# Patient Record
Sex: FEMALE | Race: WHITE
[De-identification: names, ages, dates, MRNs, and addresses within clinical notes are randomized per-mention and may not be internally consistent; named-entity substitution may affect disease eponyms.]

---

## 2019-02-21 ENCOUNTER — HOSPITAL ENCOUNTER (EMERGENCY)
Dept: HOSPITAL 97 - ER | Age: 39
Discharge: HOME | End: 2019-02-21
Payer: SELF-PAY

## 2019-02-21 DIAGNOSIS — S82.51XA: Primary | ICD-10-CM
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
## 2019-02-21 DIAGNOSIS — Z88.7: ICD-10-CM
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
## 2019-02-21 PROCEDURE — 99284 EMERGENCY DEPT VISIT MOD MDM: CPT

## 2019-02-21 PROCEDURE — 2W3QX1Z IMMOBILIZATION OF RIGHT LOWER LEG USING SPLINT: ICD-10-PCS

## 2019-02-21 PROCEDURE — 96372 THER/PROPH/DIAG INJ SC/IM: CPT

## 2019-02-21 NOTE — RAD REPORT
EXAM DESCRIPTION:  RAD - Ankle Right 3 View - 2/21/2019 8:10 am

 

CLINICAL HISTORY:  fall, pain

Trauma, pain

 

COMPARISON:  No comparisons

 

FINDINGS:  Minimal avulsion fracture is suspected in the region of the medial malleolus. Adjacent mod
erate soft tissue swelling is seen with a small ankle joint effusion noted. Small posterior and plant
ar calcaneal spurs. No dislocation.

## 2019-02-21 NOTE — ER
Nurse's Notes                                                                                     

 [facility]                                                                

Name: Maria Del Carmen Delacruz                                                                                  

Age: 38 yrs                                                                                       

Sex: Female                                                                                       

: 1980                                                                                   

MRN: [id number]                                                                                   

Arrival Date: 2019                                                                          

Time: 07:40                                                                                       

Account#: [id number]                                                                            

Bed 13                                                                                            

Private MD: Bean Mayberry E                                                                     

Diagnosis: Right Medial malleolar fx (avulsion)                                                   

                                                                                                  

Presentation:                                                                                     

                                                                                             

07:42 Presenting complaint: Patient states: "I slipped and fell last night and rolled my      aa5 

      right foot". Pt c/o right ankle pain. Pt ambulatory with crutches and walking boot.         

07:42 Transition of care: patient was not received from another setting of care. Onset of     aa5 

      symptoms was 2019. Risk Assessment: Do you want to hurt yourself or someone        

      else? Patient reports no desire to harm self or others. Initial Sepsis Screen: Does the     

      patient meet any 2 criteria? No. Patient's initial sepsis screen is negative. Does the      

      patient have a suspected source of infection? No. Patient's initial sepsis screen is        

      negative. Care prior to arrival: None.                                                      

07:42 Method Of Arrival: Ambulatory                                                           aa5 

07:42 Acuity: ISAIAS 4                                                                           aa5 

                                                                                                  

OB/GYN:                                                                                           

07:45 LMP 2019                                                                           aa5 

                                                                                                  

Historical:                                                                                       

- Allergies:                                                                                      

07:42 hepatitis Vaccine;                                                                      aa5 

- PMHx:                                                                                           

07:42 hypoglycemia;                                                                           aa5 

- PSHx:                                                                                           

07:42 R knee; Tubal ligation;                                                                 aa5 

                                                                                                  

- Ebola Screening: : No symptoms or risks identified at this time.                                

                                                                                                  

                                                                                                  

Screenin:03 Abuse screen: Denies threats or abuse. Nutritional screening: No deficits noted.        aa5 

      Tuberculosis screening: No symptoms or risk factors identified. Fall Risk Ambulatory        

      Aid- Crutches/Cane/Walker (15 pts). Total Jacobson Fall Scale indicates No Risk (0-24 pts).    

                                                                                                  

Assessment:                                                                                       

07:45 General: Appears uncomfortable, Behavior is calm, cooperative. Pain: Complains of pain  aa5 

      in right ankle Pain radiates to medial aspect of right calf Pain currently is 9 out of      

      10 on a pain scale. Quality of pain is described as sharp, throbbing, Pain began 1 day      

      ago. Is continuous, Aggravated by increased activity. Neuro: Level of Consciousness is      

      awake, alert, obeys commands, Oriented to person, place, time, situation.                   

      Cardiovascular: Capillary refill < 3 seconds is brisk in bilateral fingers toes             

      Patient's skin is warm and dry. Respiratory: Airway is patent Respiratory effort is         

      even, unlabored, Respiratory pattern is regular, symmetrical. GI: No signs and/or           

      symptoms were reported involving the gastrointestinal system. : No signs and/or           

      symptoms were reported regarding the genitourinary system. EENT: No signs and/or            

      symptoms were reported regarding the EENT system. Derm: Skin is pink, warm \T\ dry.         

      Musculoskeletal: Swelling present in right ankle.                                           

08:05 Reassessment: X-ray at bedside .                                                        aa5 

09:10 Reassessment: Patient is alert, oriented x 3, equal unlabored respirations, skin        aa5 

      warm/dry/pink. Patient states symptoms have not improved. MD was notified. .                

09:10 Pain: Pain currently is 9 out of 10 on a pain scale.                                    aa5 

09:30 Reassessment: Patient is alert, oriented x 3, equal unlabored respirations, skin        aa5 

      warm/dry/pink. Patient states symptoms have improved. Waiting for pt's mother for ride      

      home. Pain: Pain currently is 6 out of 10 on a pain scale.                                  

                                                                                                  

Vital Signs:                                                                                      

07:45  / 102; Pulse 111; Resp 18 S; Temp 98.4(O); Pulse Ox 100% on R/A; Weight 80.74 kg aa5 

      (R); Height 5 ft. 5 in. (165.10 cm) (R); Pain 9/10;                                         

09:30  / 99; Pulse 95; Resp 16 S; Pulse Ox 100% on R/A;                                 aa5 

07:45 Body Mass Index 29.62 (80.74 kg, 165.10 cm)                                             aa5 

                                                                                                  

ED Course:                                                                                        

07:40 Patient arrived in ED.                                                                  mr  

07:40 Bean Mayberry MD is Private Physician.                                                mr  

07:42 Arm band placed on Patient placed in an exam room, on a stretcher.                      aa5 

07:42 Patient has correct armband on for positive identification. Bed in low position. Call   aa5 

      light in reach. Side rails up X 1.                                                          

07:43 Galileo Coyle MD is Attending Physician.                                             ps1 

07:43 Luisana Lock, RN is Primary Nurse.                                                   aa5 

07:59 Triage completed.                                                                       aa5 

08:11 Ankle Right 3 View XRAY In Process Unspecified.                                         EDMS

08:41 Davis Choi MD is Referral Physician.                                               ps1 

09:15 Orthoglass splint: Posterior short lleg splint applied on right leg.                    mh5 

09:40 No provider procedures requiring assistance completed. Patient did not have IV access   aa5 

      during this emergency room visit.                                                           

                                                                                                  

Administered Medications:                                                                         

07:52 Drug: Norco (7.5 mg-325 mg) 1 tabs Route: PO;                                           aa5 

09:10 Follow up: Response: No adverse reaction; Pain is unchanged, physician notified         aa5 

09:11 Drug: TORadol 30 mg Route: IM; Site: left gluteus;                                      aa5 

09:30 Follow up: Response: No adverse reaction; Pain is decreased                             aa5 

                                                                                                  

                                                                                                  

Outcome:                                                                                          

08:42 Discharge ordered by MD.                                                                ps1 

09:40 Discharged to home via wheelchair, with crutches, with family.                          aa5 

09:40 Condition: stable                                                                           

09:40 Discharge instructions given to patient, Instructed on discharge instructions, follow       

      up and referral plans. medication usage, Demonstrated understanding of instructions,        

      follow-up care, medications, Prescriptions given X 4.                                       

09:45 Patient left the ED.                                                                    aa5 

                                                                                                  

Signatures:                                                                                       

Dispatcher [employer]                           VIVIANMS                                                 

Zakiya MuhammadderLuisana mccormick, RN                     RN   5                                                  

Cyndi Mathew                              5                                                  

Galileo Coyle MD MD   ps1                                                  

                                                                                                  

Corrections: (The following items were deleted from the chart)                                    

10:50 09:56 Patient left the ED. aa5                                                          aa5 

                                                                                                  

**************************************************************************************************

## 2019-02-21 NOTE — EDPHYS
Physician Documentation                                                                           

 [facility]                                                                

Name: Maria Del Carmen Delacruz                                                                                  

Age: 38 yrs                                                                                       

Sex: Female                                                                                       

: 1980                                                                                   

MRN: [id number]                                                                                   

Arrival Date: 2019                                                                          

Time: 07:40                                                                                       

Account#: [id number]                                                                            

Bed 13                                                                                            

Private MD: Bean Mayberry E                                                                     

ED Physician Galileo Coyle                                                                      

HPI:                                                                                              

                                                                                             

07:47 This 38 yrs old  Female presents to ER via [facility] with complaints of Ankle ps1 

      Injury.                                                                                     

07:47 patient was corralling dogs into a pen last night and had an eversion injury to the     ps1 

      right ankle. Patient said she heard a pop. Pain localized to lateral and medial             

      malleolus. Pain rated as moderate. Mild amount of superficial swelling. Difficulty to       

      bear weight. Did not hit head, no LOC. Patient presented in a walking boot. .               

                                                                                                  

OB/GYN:                                                                                           

07:45 LMP 2019                                                                           aa5 

                                                                                                  

Historical:                                                                                       

- Allergies:                                                                                      

07:42 hepatitis Vaccine;                                                                      aa5 

- PMHx:                                                                                           

07:42 hypoglycemia;                                                                           aa5 

- PSHx:                                                                                           

07:42 R knee; Tubal ligation;                                                                 aa5 

                                                                                                  

- Ebola Screening: : No symptoms or risks identified at this time.                                

                                                                                                  

                                                                                                  

ROS:                                                                                              

07:47 Constitutional: Negative for fever, chills, and weight loss, Eyes: Negative for injury, ps1 

      pain, redness, and discharge, Cardiovascular: Negative for chest pain, palpitations,        

      and edema, Respiratory: Negative for shortness of breath, cough, wheezing, and              

      pleuritic chest pain, Abdomen/GI: Negative for abdominal pain, nausea, vomiting,            

      diarrhea, and constipation, Back: Negative for injury and pain, Skin: Negative for          

      injury, rash, and discoloration, Neuro: Negative for headache, weakness, numbness,          

      tingling, and seizure.                                                                      

07:47 MS/extremity: Positive for pain, swelling, tenderness, of the right lateral malleolus       

      and right medial malleolus.                                                                 

                                                                                                  

Exam:                                                                                             

07:50 Constitutional:  This is a well developed, well nourished patient who is awake, alert,  ps1 

      and in no acute distress. Head/Face:  Normocephalic, atraumatic. Eyes:  Pupils equal        

      round and reactive to light, extra-ocular motions intact.  Lids and lashes normal.          

      Conjunctiva and sclera are non-icteric and not injected. Chest/axilla:  Normal chest        

      wall appearance and motion.  Nontender with no deformity.  No lesions are appreciated.      

      Cardiovascular:  Regular rate and rhythm.  No gallops, murmurs, or rubs.  Normal PMI,       

      no JVD.  No pulse deficits. Respiratory:  Lungs have equal breath sounds bilaterally,       

      clear to auscultation and percussion.  No rales, rhonchi or wheezes noted.  No              

      increased work of breathing, no retractions or nasal flaring. Abdomen/GI:  Soft,            

      non-tender, with normal bowel sounds.  No distension or tympany.  No guarding or            

      rebound.  No evidence of tenderness throughout. Skin:  Warm, dry with normal turgor.        

      Normal color with no rashes, no lesions, and no evidence of cellulitis. Neuro:  Awake       

      and alert, GCS 15, oriented to person, place, time, and situation.  Cranial nerves          

      II-XII grossly intact. Sensory grossly intact. Psych:  Awake, alert, with orientation       

      to person, place and time.  Behavior, mood, and affect are within normal limits.            

07:50 Musculoskeletal/extremity: Extremities: grossly normal except: noted in the right           

      lateral malleolus: swelling, tenderness, There is no evidence of  deformity.                

                                                                                                  

Vital Signs:                                                                                      

07:45  / 102; Pulse 111; Resp 18 S; Temp 98.4(O); Pulse Ox 100% on R/A; Weight 80.74 kg aa5 

      (R); Height 5 ft. 5 in. (165.10 cm) (R); Pain 9/10;                                         

09:30  / 99; Pulse 95; Resp 16 S; Pulse Ox 100% on R/A;                                 aa5 

07:45 Body Mass Index 29.62 (80.74 kg, 165.10 cm)                                             aa5 

                                                                                                  

Procedures:                                                                                       

08:48 Splinting: Splint applied to right leg using Orthoglass splint, applied by tech.        ps1 

      Examined by me, post splint application: neurovascular intact, 2+ distal pulses             

      palpable, brisk capillary refill noted, Patient tolerated well.                             

                                                                                                  

MDM:                                                                                              

07:53 Patient medically screened.                                                             ps1 

08:48 Data reviewed: vital signs, nurses notes, radiologic studies, and as a result, I will   ps1 

      discharge patient.                                                                          

                                                                                                  

                                                                                             

07:51 Order name: Ankle Right 3 View XRAY; Complete Time: 08:39                               ps1 

                                                                                             

08:52 Order name: Posterior Leg Splint: short leg; Complete Time: 09:10                       aa5 

                                                                                                  

Administered Medications:                                                                         

07:52 Drug: Norco (7.5 mg-325 mg) 1 tabs Route: PO;                                           aa5 

09:10 Follow up: Response: No adverse reaction; Pain is unchanged, physician notified         aa5 

09:11 Drug: TORadol 30 mg Route: IM; Site: left gluteus;                                      aa5 

09:30 Follow up: Response: No adverse reaction; Pain is decreased                             aa5 

                                                                                                  

                                                                                                  

Disposition:                                                                                      

19 08:42 Discharged to Home. Impression: Right Medial malleolar fx (avulsion).              

- Condition is Stable.                                                                            

- Discharge Instructions: Ankle Fracture, Easy-to-Read.                                           

- Prescriptions for Anaprox  mg Oral Tablet - take 1 tablet by ORAL route every             

  12 hours As needed; 20 tablet. Tylenol- Codeine #3 300-30 mg Oral Tablet - take 2               

  tablet by ORAL route every 6 hours As needed; 30 tablet. Zofran 4 mg Oral Tablet -              

  take 1 tablet by ORAL route every 12 hours As needed; 20 tablet. Medrol (Willie) 4 mg              

  Oral Tablets, Dose Pack - take 1 tablet by ORAL route as directed - follow package              

  instructions; 1 packet.                                                                         

- Medication Reconciliation Form, Thank You Letter, Antibiotic Education, Prescription            

  Opioid Use form.                                                                                

- Follow up: Davis Choi MD; When: 1 week; Reason: Further diagnostic work-up,                

  Recheck today's complaints, Continuance of care.                                                

- Problem is new.                                                                                 

- Symptoms have improved.                                                                         

                                                                                                  

                                                                                                  

                                                                                                  

Signatures:                                                                                       

Dispatcher [employer]                           EDMS                                                 

Luisana Lock RN                     RN   aa5                                                  

Galileo Coyle MD MD   ps1                                                  

                                                                                                  

Corrections: (The following items were deleted from the chart)                                    

09:56 08:42 2019 08:42 Discharged to Home. Impression: Right Medial malleolar fx        aa5 

      (avulsion). Condition is Stable. Forms are Medication Reconciliation Form, Thank You        

      Letter, Antibiotic Education, Prescription Opioid Use. Follow up: Davis Choi; When:      

      1 week; Reason: Further diagnostic work-up, Recheck today's complaints, Continuance of      

      care. Problem is new. Symptoms have improved. ps1                                           

                                                                                                  

**************************************************************************************************

## 2020-03-03 ENCOUNTER — HOSPITAL ENCOUNTER (EMERGENCY)
Dept: HOSPITAL 97 - ER | Age: 40
Discharge: HOME | End: 2020-03-03
Payer: COMMERCIAL

## 2020-03-03 VITALS — SYSTOLIC BLOOD PRESSURE: 123 MMHG | DIASTOLIC BLOOD PRESSURE: 96 MMHG | TEMPERATURE: 98 F

## 2020-03-03 VITALS — OXYGEN SATURATION: 100 %

## 2020-03-03 DIAGNOSIS — J45.909: ICD-10-CM

## 2020-03-03 DIAGNOSIS — F17.210: ICD-10-CM

## 2020-03-03 DIAGNOSIS — R07.1: Primary | ICD-10-CM

## 2020-03-03 LAB
ALBUMIN SERPL BCP-MCNC: 3.8 G/DL (ref 3.4–5)
ALP SERPL-CCNC: 95 U/L (ref 45–117)
ALT SERPL W P-5'-P-CCNC: 19 U/L (ref 12–78)
AST SERPL W P-5'-P-CCNC: 10 U/L (ref 15–37)
BUN BLD-MCNC: 15 MG/DL (ref 7–18)
GLUCOSE SERPLBLD-MCNC: 96 MG/DL (ref 74–106)
HCT VFR BLD CALC: 39.3 % (ref 36–45)
INR BLD: 0.91
LYMPHOCYTES # SPEC AUTO: 1.3 K/UL (ref 0.7–4.9)
MAGNESIUM SERPL-MCNC: 2.2 MG/DL (ref 1.8–2.4)
NT-PROBNP SERPL-MCNC: 53 PG/ML (ref ?–125)
PMV BLD: 8.5 FL (ref 7.6–11.3)
POTASSIUM SERPL-SCNC: 3.4 MMOL/L (ref 3.5–5.1)
RBC # BLD: 4.51 M/UL (ref 3.86–4.86)
TROPONIN (EMERG DEPT USE ONLY): < 0.02 NG/ML (ref 0–0.04)

## 2020-03-03 PROCEDURE — 83880 ASSAY OF NATRIURETIC PEPTIDE: CPT

## 2020-03-03 PROCEDURE — 93005 ELECTROCARDIOGRAM TRACING: CPT

## 2020-03-03 PROCEDURE — 85610 PROTHROMBIN TIME: CPT

## 2020-03-03 PROCEDURE — 83735 ASSAY OF MAGNESIUM: CPT

## 2020-03-03 PROCEDURE — 96374 THER/PROPH/DIAG INJ IV PUSH: CPT

## 2020-03-03 PROCEDURE — 99285 EMERGENCY DEPT VISIT HI MDM: CPT

## 2020-03-03 PROCEDURE — 85025 COMPLETE CBC W/AUTO DIFF WBC: CPT

## 2020-03-03 PROCEDURE — 80076 HEPATIC FUNCTION PANEL: CPT

## 2020-03-03 PROCEDURE — 80048 BASIC METABOLIC PNL TOTAL CA: CPT

## 2020-03-03 PROCEDURE — 71045 X-RAY EXAM CHEST 1 VIEW: CPT

## 2020-03-03 PROCEDURE — 36415 COLL VENOUS BLD VENIPUNCTURE: CPT

## 2020-03-03 PROCEDURE — 84484 ASSAY OF TROPONIN QUANT: CPT

## 2020-03-03 NOTE — EDPHYS
Physician Documentation                                                                           

 [facility]                                                                 

Name: Maria Del Carmen Delacruz                                                                                  

Age: 39 yrs                                                                                       

Sex: Female                                                                                       

: 1980                                                                                   

MRN: [id number]                                                                                   

Arrival Date: 2020                                                                          

Time: 16:09                                                                                       

Account#: [id number]                                                                            

Bed 5                                                                                             

Private MD:                                                                                       

ED Physician Calixto Cardoso                                                                    

HPI:                                                                                              

                                                                                             

18:02 This 39 yrs old  Female presents to ER via Ambulatory with complaints of Chest ma2 

      Pain.                                                                                       

18:02 The patient or guardian reports chest pain that is located primarily in the substernal  ma2 

      area. Onset: gradually, 1 day(s) ago. Associated signs and symptoms: Pertinent              

      negatives: abdominal pain, diaphoresis, headache, lower extremity pain. The chest pain      

      is described as aching. Severity of pain: At its worst the pain was mild in the             

      emergency department the pain is unchanged. The patient has not experienced similar         

      symptoms in the past.                                                                       

                                                                                                  

OB/GYN:                                                                                           

16:17 LMP 2020                                                                           ca1 

                                                                                                  

Historical:                                                                                       

- Allergies:                                                                                      

16:17 hepatitis Vaccine;                                                                      ca1 

- Home Meds:                                                                                      

16:17 Singulair Oral [Active]; alegra [Active]; Ventolin HFA 90 mcg/actuation Nebulizer HFAA  ca1 

      [Active];                                                                                   

- PMHx:                                                                                           

16:17 HYPOGLYCEMIA; Asthma;                                                                   ca1 

- PSHx:                                                                                           

16:17 R knee; Tubal ligation;                                                                 ca1 

                                                                                                  

- Immunization history:: Adult Immunizations up to date, Flu vaccine is up to date.               

- Social history:: Smoking status: Patient reports the use of cigarette tobacco                   

  products, smokes one-half pack cigarettes per day, Patient/guardian denies using                

  alcohol, street drugs, The patient lives with family.                                           

- Family history:: not pertinent.                                                                 

                                                                                                  

                                                                                                  

ROS:                                                                                              

18:02 Constitutional: Negative for fever, chills, and weight loss.                            ma2 

18:02 All other systems are negative.                                                             

                                                                                                  

Exam:                                                                                             

18:02 Constitutional:  This is a well developed, well nourished patient who is awake, alert,  ma2 

      and in no acute distress. Chest/axilla:  Normal chest wall appearance and motion.           

      Nontender with no deformity.  No lesions are appreciated. Cardiovascular:  Regular rate     

      and rhythm with a normal S1 and S2.  No gallops, murmurs, or rubs.  Normal PMI, no JVD.     

       No pulse deficits. Respiratory:  Lungs have equal breath sounds bilaterally, clear to      

      auscultation and percussion.  No rales, rhonchi or wheezes noted.  No increased work of     

      breathing, no retractions or nasal flaring. Abdomen/GI:  Soft, non-tender, with normal      

      bowel sounds.  No distension or tympany.  No guarding or rebound.  No evidence of           

      tenderness throughout. Skin:  Warm, dry with normal turgor.  Normal color with no           

      rashes, no lesions, and no evidence of cellulitis. MS/ Extremity:  Pulses equal, no         

      cyanosis.  Neurovascular intact.  Full, normal range of motion. Neuro:  Awake and           

      alert, GCS 15, oriented to person, place, time, and situation.  Cranial nerves II-XII       

      grossly intact.  Motor strength 5/5 in all extremities.  Sensory grossly intact.            

      Cerebellar exam normal.  Normal gait. Psych:  Awake, alert, with orientation to person,     

      place and time.  Behavior, mood, and affect are within normal limits.                       

18:02 Psych: Behavior/mood is anxious.                                                            

                                                                                                  

Vital Signs:                                                                                      

16:13  / 88; Pulse 101; Resp 20 S; Temp 97.2(O); Pulse Ox 100% on R/A; Weight 79.83 kg  ca1 

      (R); Height 5 ft. 5 in. (165.10 cm) (R); Pain 6/10;                                         

17:15  / 79; Pulse 90; Resp 12; Pulse Ox 100% on R/A;                                   rb1 

18:29  / 96; Pulse 90; Resp 16; Temp 98; Pulse Ox 100% ;                                bp  

16:13 Body Mass Index 29.29 (79.83 kg, 165.10 cm)                                             ca1 

                                                                                                  

MDM:                                                                                              

16:18 Patient medically screened.                                                             ma2 

18:02 Differential diagnosis: gastritis, pleurisy, anxiety. HEART Score: Total Score = 0. The ma2 

      patient was not given aspirin in the Emergency Department. SMILEY Risk Score: TOTAL SCORE     

      = 0. Data reviewed: vital signs, nurses notes. Counseling: I had a detailed discussion      

      with the patient and/or guardian regarding: the historical points, exam findings, and       

      any diagnostic results supporting the discharge/admit diagnosis, the presence of at         

      least one elevated blood pressure reading (>120/80) during this emergency department        

      visit, the need for outpatient follow up. Response to treatment: There is no                

      appreciated change of the patient's symptoms at this time, the patient's symptoms have      

      resolved after treatment, the patient's symptoms have worsened after treatment.             

                                                                                                  

                                                                                             

16:18 Order name: Basic Metabolic Panel                                                       [facility] 

                                                                                             

16:18 Order name: CBC with Diff                                                               [facility] 

                                                                                             

16:18 Order name: LFT's                                                                       [facility] 

                                                                                             

16:18 Order name: Magnesium                                                                   [facility] 

                                                                                             

16:18 Order name: NT PRO-BNP                                                                  [facility] 

                                                                                             

16:18 Order name: PT-INR                                                                      [facility] 

                                                                                             

16:18 Order name: Troponin (emerg Dept Use Only)                                              [facility] 

                                                                                             

16:18 Order name: XRAY Chest (1 view)                                                         [facility] 

                                                                                             

16:18 Order name: EKG; Complete Time: 16:19                                                   [facility] 

                                                                                             

16:18 Order name: Cardiac monitoring; Complete Time: 16:38                                    [facility] 

                                                                                             

16:18 Order name: EKG - Nurse/Tech; Complete Time: 16:27                                      [facility] 

                                                                                             

16:18 Order name: IV Saline Lock; Complete Time: 16:38                                        [facility] 

                                                                                             

16:18 Order name: Labs collected and sent; Complete Time: 16:38                               [facility] 

                                                                                             

16:18 Order name: O2 Per Protocol; Complete Time: 16:38                                       [facility] 

                                                                                             

16:18 Order name: O2 Sat Monitoring; Complete Time: 16:38                                     [facility] 

                                                                                                  

Administered Medications:                                                                         

17:30 Drug: Ativan 1 mg Route: IVP; Site: right forearm;                                      bp  

18:31 Follow up: Response: Anxiety decreased                                                  bp  

                                                                                                  

                                                                                                  

Disposition:                                                                                      

20 18:04 Discharged to Home. Impression: Chest pain on breathing.                           

- Condition is Stable.                                                                            

- Discharge Instructions: Chest Wall Pain.                                                        

- Prescriptions for Ativan 0.5 mg Oral Tablet - take 1 tablet by ORAL route every 8               

  hours As needed; 20 tablet.                                                                     

- Medication Reconciliation Form, Thank You Letter, Antibiotic Education, Prescription            

  Opioid Use form.                                                                                

- Follow up: Private Physician; When: Tomorrow; Reason: Recheck today's complaints,               

  Continuance of care.                                                                            

                                                                                                  

                                                                                                  

                                                                                                  

Signatures:                                                                                       

Dispatcher [employer]                           Jayden Valadez RN                      RN   Calixto Hennessy MD MD   ma2                                                  

Alana Faust RN RN   ca1                                                  

                                                                                                  

Corrections: (The following items were deleted from the chart)                                    

18:31 18:04 2020 18:04 Discharged to Home. Impression: Chest pain on breathing.         bp  

      Condition is Stable. Prescriptions for Ativan 0.5 mg Oral Tablet - take 1 tablet by         

      ORAL route every 8 hours As needed; 20 tablet. and Forms are Medication Reconciliation      

      Form, Thank You Letter, Antibiotic Education, Prescription Opioid Use. Follow up:           

      Private Physician; When: Tomorrow; Reason: Recheck today's complaints, Continuance of       

      care. ma2                                                                                   

                                                                                                  

**************************************************************************************************

## 2020-03-03 NOTE — RAD REPORT
EXAM DESCRIPTION:  Sergio Single View3/3/2020 5:31 pm

 

CLINICAL HISTORY:  Chest pain

 

COMPARISON:  none

 

FINDINGS:   The lungs appear clear of acute infiltrate. The heart is normal size

 

IMPRESSION:   No acute abnormalities displayed

## 2020-03-03 NOTE — ER
Nurse's Notes                                                                                     

 [facility]                                                                 

Name: Maria Del Carmen Delacruz                                                                                  

Age: 39 yrs                                                                                       

Sex: Female                                                                                       

: 1980                                                                                   

MRN: [id number]                                                                                   

Arrival Date: 2020                                                                          

Time: 16:09                                                                                       

Account#: [id number]                                                                            

Bed 5                                                                                             

Private MD:                                                                                       

Diagnosis: Chest pain on breathing                                                                

                                                                                                  

Presentation:                                                                                     

                                                                                             

16:13 Chief complaint: Patient states: Chest pain started 30-45 minutes ago. Reports headache ca1 

      and tingly sensation on arms and hands. Coronavirus screen: The patient has NOT             

      traveled to [place] in the past 14 days. The patient has NOT had contact with known           

      and/or suspected case of Coronavirus. Ebola Screen: Patient negative for fever greater      

      than or equal to 101.5 degrees Fahrenheit, and additional compatible Ebola Virus            

      Disease symptoms Patient denies exposure to infectious person. Patient denies travel to     

      an Ebola-affected area in the 21 days before illness onset. No symptoms or risks            

      identified at this time. Initial Sepsis Screen: Does the patient meet any 2 criteria?       

      No. Patient's initial sepsis screen is negative. Does the patient have a suspected          

      source of infection? No. Patient's initial sepsis screen is negative. Risk Assessment:      

      Do you want to hurt yourself or someone else? Patient reports no desire to harm self or     

      others. Onset of symptoms was 2020.                                               

16:13 Method Of Arrival: Ambulatory                                                           ca1 

16:13 Acuity: ISAIAS 3                                                                           ca1 

                                                                                                  

Triage Assessment:                                                                                

16:15 General: Appears in no apparent distress. comfortable, Behavior is cooperative,         bp  

      appropriate for age, anxious. Pain: Complains of pain in chest. EENT: No deficits           

      noted. Neuro: No deficits noted. Cardiovascular: No deficits noted. Respiratory: No         

      deficits noted. GI: No signs and/or symptoms were reported involving the                    

      gastrointestinal system. : No signs and/or symptoms were reported regarding the           

      genitourinary system. Derm: No deficits noted. Musculoskeletal: No deficits noted.          

                                                                                                  

OB/GYN:                                                                                           

16:17 LMP 2020                                                                           ca1 

                                                                                                  

Historical:                                                                                       

- Allergies:                                                                                      

16:17 hepatitis Vaccine;                                                                      ca1 

- Home Meds:                                                                                      

16:17 Singulair Oral [Active]; alegra [Active]; Ventolin HFA 90 mcg/actuation Nebulizer HFAA  ca1 

      [Active];                                                                                   

- PMHx:                                                                                           

16:17 HYPOGLYCEMIA; Asthma;                                                                   ca1 

- PSHx:                                                                                           

16:17 R knee; Tubal ligation;                                                                 ca1 

                                                                                                  

- Immunization history:: Adult Immunizations up to date, Flu vaccine is up to date.               

- Social history:: Smoking status: Patient reports the use of cigarette tobacco                   

  products, smokes one-half pack cigarettes per day, Patient/guardian denies using                

  alcohol, street drugs, The patient lives with family.                                           

- Family history:: not pertinent.                                                                 

                                                                                                  

                                                                                                  

Screenin:15 Abuse screen: Denies threats or abuse. Denies injuries from another. Nutritional        bp  

      screening: No deficits noted. Tuberculosis screening: No symptoms or risk factors           

      identified. Fall Risk None identified.                                                      

                                                                                                  

Assessment:                                                                                       

16:15 General: SEE TRIAGE NOTE. Pain: Complains of pain in chest Pain does not radiate. Pain  bp  

      began 1 hour ago. Cardiovascular: Rhythm is sinus tachycardia.                              

18:29 Reassessment: PT D/C HOME AMBULATORY WITH FAMILY, DX WITH CHEST WALL PAIN AND ANXIETY.  bp  

                                                                                                  

Vital Signs:                                                                                      

16:13  / 88; Pulse 101; Resp 20 S; Temp 97.2(O); Pulse Ox 100% on R/A; Weight 79.83 kg  ca1 

      (R); Height 5 ft. 5 in. (165.10 cm) (R); Pain 6/10;                                         

17:15  / 79; Pulse 90; Resp 12; Pulse Ox 100% on R/A;                                   rb1 

18:29  / 96; Pulse 90; Resp 16; Temp 98; Pulse Ox 100% ;                                bp  

16:13 Body Mass Index 29.29 (79.83 kg, 165.10 cm)                                             ca1 

                                                                                                  

ED Course:                                                                                        

16:09 Patient arrived in ED.                                                                  mr  

16:15 Triage completed.                                                                       ca1 

16:15 Patient has correct armband on for positive identification. Bed in low position. Call   bp  

      light in reach. Side rails up X2. Adult w/ patient. Cardiac monitor on. Pulse ox on.        

      NIBP on.                                                                                    

16:17 Arm band placed on right wrist.                                                         ca1 

16:18 Calixto Cardoso MD is Attending Physician.                                           ma2 

16:24 Jayden Bragg, RN is Primary Nurse.                                                    bp  

16:27 EKG done, by ED staff, reviewed by Calixto Cardoso MD.                                 em1 

16:30 Inserted saline lock: 20 gauge in right antecubital area, using aseptic technique.      bp  

      Blood collected. Patient maintains SpO2 saturation greater than 95% on room air.            

17:32 XRAY Chest (1 view) In Process Unspecified.                                             EDMS

18:29 No provider procedures requiring assistance completed. IV discontinued, intact,         bp  

      bleeding controlled, No redness/swelling at site. Pressure dressing applied.                

                                                                                                  

Administered Medications:                                                                         

17:30 Drug: Ativan 1 mg Route: IVP; Site: right forearm;                                      bp  

18:31 Follow up: Response: Anxiety decreased                                                  bp  

                                                                                                  

                                                                                                  

Outcome:                                                                                          

18:04 Discharge ordered by MD.                                                                lynette 

18:29 Discharged to home ambulatory, with family.                                             bp  

18:29 Condition: stable                                                                           

18:29 Discharge instructions given to patient, Instructed on discharge instructions, follow       

      up and referral plans. medication usage, Demonstrated understanding of instructions,        

      follow-up care, medications, Prescriptions given X 1.                                       

18:31 Patient left the ED.                                                                    bp  

                                                                                                  

Signatures:                                                                                       

Dispatcher [employer]                           EDMS                                                 

Zakiya MuhammadPietro                               em1                                                  

Torri Nguyen, RN                     RN   rb1                                                  

Jayden Bragg RN                      RN   bp                                                   

Calixto Cardoso MD MD ma2 Acob, Cheryl, RN                        RN   ca1                                                  

                                                                                                  

**************************************************************************************************

## 2020-03-04 NOTE — EKG
Test Date:    2020-03-03               Test Time:    16:26:42

Technician:   ELM                                    

                                                     

MEASUREMENT RESULTS:                                       

Intervals:                                           

Rate:         87                                     

MN:           138                                    

QRSD:         84                                     

QT:           372                                    

QTc:          447                                    

Axis:                                                

P:            52                                     

MN:           138                                    

QRS:          56                                     

T:            32                                     

                                                     

INTERPRETIVE STATEMENTS:                                       

                                                     

Normal sinus rhythm

Normal ECG

No previous ECG available for comparison



Electronically Signed On 03-04-20 08:24:33 CST by Fabien Matthew

## 2022-03-14 ENCOUNTER — HOSPITAL ENCOUNTER (EMERGENCY)
Dept: HOSPITAL 97 - ER | Age: 42
Discharge: HOME | End: 2022-03-14
Payer: COMMERCIAL

## 2022-03-14 VITALS — TEMPERATURE: 98.6 F | OXYGEN SATURATION: 100 %

## 2022-03-14 VITALS — DIASTOLIC BLOOD PRESSURE: 87 MMHG | SYSTOLIC BLOOD PRESSURE: 118 MMHG

## 2022-03-14 DIAGNOSIS — Z88.7: ICD-10-CM

## 2022-03-14 DIAGNOSIS — M25.562: ICD-10-CM
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
## 2022-03-14 DIAGNOSIS — F17.210: ICD-10-CM

## 2022-03-14 DIAGNOSIS — R07.9: Primary | ICD-10-CM

## 2022-03-14 PROCEDURE — 99284 EMERGENCY DEPT VISIT MOD MDM: CPT

## 2022-03-14 PROCEDURE — 72125 CT NECK SPINE W/O DYE: CPT

## 2022-03-14 PROCEDURE — 70450 CT HEAD/BRAIN W/O DYE: CPT

## 2022-03-14 NOTE — RAD REPORT
EXAM DESCRIPTION:  RAD - Ribs  Right - 3/14/2022 12:09 pm

 

CLINICAL HISTORY:  PAIN

 

COMPARISON:  Chest Single View dated 3/3/2020

 

FINDINGS:  No displaced rib fracture is seen. No underlying pneumothorax.

## 2022-03-14 NOTE — EDPHYS
Physician Documentation                                                                           

 [facility]                                                                 

Name: Maria Del Carmen Delacruz                                                                                  

Age: 41 yrs                                                                                       

Sex: Female                                                                                       

: 1980                                                                                   

MRN: [id number]                                                                                   

Arrival Date: 2022                                                                          

Time: 10:50                                                                                       

Account#: [id number]                                                                            

Bed 8                                                                                             

Private MD:                                                                                       

ED Physician Justin Vega                                                                       

HPI:                                                                                              

                                                                                             

11:25 This 41 yrs old Female presents to ER via Ambulatory with complaints of Trampled by cow.cp  

11:25 Trauma demographics: County: The injury occurred in [place] Location of Injury: The    cp  

      injury occurred outdoors, Date: 2022.                                             

11:25 Mechanism of injury: struck by cow. Associated injuries: The patient sustained injury   cp  

      to the head, injury to the chest, specifically the right lower lateral rib area, left       

      knee. Onset: The symptoms/episode began/occurred this morning. Patient reports she was      

      assisting in loading cow onto trailer when she was knocked to ground. Patient reports       

      striking back of head against ground. Unsure of LOC. Patient c/o right side rib pain,       

      left knee pain.                                                                             

                                                                                                  

OB/GYN:                                                                                           

11:21 LMP 3/9/2022                                                                            ap3 

                                                                                                  

Historical:                                                                                       

- Allergies:                                                                                      

11:17 hepatitis Vaccine;                                                                      ap3 

- PMHx:                                                                                           

11:17 Asthma; HYPOGLYCEMIA;                                                                   ap3 

                                                                                                  

- Immunization history:: Client reports having NOT received the Covid vaccine. Last               

  tetanus immunization: up to date Flu vaccine is up to date.                                     

- Social history:: Smoking status: Patient reports the use of cigarette tobacco                   

  products, denies chronic smoking, but will smoke occasionally.                                  

                                                                                                  

                                                                                                  

Exam:                                                                                             

11:33 Constitutional: The patient appears in no acute distress, alert, awake, non-toxic, well cp  

      developed, well nourished.                                                                  

11:33 Head/Face:  Normocephalic, atraumatic.                                                  cp  

11:33 Eyes: Periorbital structures: appear normal, Pupils: equal, round, and reactive to          

      light and accomodation, Extraocular movements: intact throughout, Conjunctiva: normal,      

      no exudate, no injection, Sclera: no appreciated abnormality, Lids and lashes: appear       

      normal, bilaterally.                                                                        

11:33 ENT: External ear(s): are unremarkable, Nose: is normal, Mouth: Lips: moist, Oral           

      mucosa: moist, Posterior pharynx: Airway: no evidence of obstruction, patent.               

11:33 Neck: C-spine: vertebral tenderness, that is mild, appreciated at  C2 and C3, crepitus,     

      is not appreciated, ROM/movement: is normal, is supple, without pain, no range of           

      motions limitations, no nuchal rigidity.                                                    

11:33 Chest/axilla: Inspection: normal, Palpation: crepitus, is not appreciated, tenderness,      

      that is mild, of the  right lower lateral rib area.                                         

11:33 Cardiovascular: Rate: normal, Rhythm: regular, Edema: is not appreciated, JVD: is not       

      appreciated.                                                                                

11:33 Respiratory: the patient does not display signs of respiratory distress,  Respirations:     

      normal, no use of accessory muscles, no retractions, labored breathing, is not present,     

      Breath sounds: are clear throughout, no decreased breath sounds, no stridor, no             

      wheezing.                                                                                   

11:33 Abdomen/GI: Inspection: abdomen appears normal, Palpation: abdomen is soft and              

      non-tender, in all quadrants.                                                               

11:33 Back: vertebral tenderness, is not appreciated.                                             

11:33 Musculoskeletal/extremity: Extremities: noted in the anterior left knee: ecchymosis,        

      pain, There is no evidence of  decreased ROM, deformity, swelling, ROM: full passive        

      range of motion, in the left knee.                                                          

11:33 Neuro: Orientation: to person, place \T\ time. Mentation: is normal, Motor: moves all       

      fours, strength is normal, Sensation: is normal.                                            

                                                                                                  

Vital Signs:                                                                                      

11:14  / 85; Pulse 84; Resp 17; Temp 98.6; Pulse Ox 100% ; Weight 77.11 kg; Height 5    ap3 

      ft. 5 in. (165.10 cm); Pain 6/10;                                                           

12:19  / 87; Pulse 80; Resp 16 S; Pulse Ox 100% on R/A;                                 jg9 

11:14 Body Mass Index 28.29 (77.11 kg, 165.10 cm)                                             ap3 

                                                                                                  

MDM:                                                                                              

11:23 Patient medically screened.                                                               

12:37 Data reviewed: vital signs, nurses notes, radiologic studies, CT scan, plain films.       

                                                                                                  

                                                                                             

11:21 Order name: CT Head C Spine; Complete Time: 12:00                                         

                                                                                             

12:00 Interpretation: Reviewed report.                                                          

                                                                                             

11:21 Order name: XRAY Knee LEFT 3 view                                                         

                                                                                             

11:21 Order name: XRAY Ribs RIGHT                                                               

                                                                                             

12:29 Order name: Ace wrap-joint: left knee; Complete Time: 12:48                             cp  

                                                                                                  

Administered Medications:                                                                         

12:26 Drug: Hydrocodone-Acetaminophen (7.5 mg-325 mg) 1 tabs Route: PO;                       jg9 

12:48 Follow up: Response: No adverse reaction; Pain is decreased                             jg9 

                                                                                                  

                                                                                                  

Disposition:                                                                                      

13:28 Co-signature as Attending Physician, Justin Vega MD I agree with the assessment and   kdr 

      plan of care.                                                                               

                                                                                                  

Disposition Summary:                                                                              

22 12:43                                                                                    

Discharge Ordered                                                                                 

      Location: Home                                                                          cp  

      Problem: new                                                                            cp  

      Symptoms: have improved                                                                 cp  

      Condition: Stable                                                                       cp  

      Diagnosis                                                                                   

        - Pain in left knee                                                                   cp  

        - Chest pain, unspecified - right lower rib pain                                      cp  

      Followup:                                                                               cp  

        - With: Private Physician                                                                  

        - When: 2 - 3 days                                                                         

        - Reason: Recheck today's complaints                                                       

      Discharge Instructions:                                                                     

        - Discharge Summary Sheet                                                             cp  

        - Elastic Bandage and RICE Therapy                                                    cp  

        - Rib Contusion                                                                       cp  

        - Acute Knee Pain, Adult                                                              cp  

      Forms:                                                                                      

        - Medication Reconciliation Form                                                      cp  

        - Thank You Letter                                                                    cp  

        - Antibiotic Education                                                                cp  

        - Prescription Opioid Use                                                             cp  

      Prescriptions:                                                                              

        - Ibuprofen 800 mg Oral Tablet                                                             

            - take 1 tablet by ORAL route every 8 hours As needed take with food; 30 tablet;  cp  

      Refills: 0, Product Selection Permitted                                                     

        - Cyclobenzaprine 10 mg Oral Tablet                                                        

            - take 1 tablet by ORAL route every 8 hours As needed; 20 tablet; Refills: 0,     cp  

      Product Selection Permitted                                                                 

        - Tramadol 50 mg Oral Tablet                                                               

            - take 1 tablet by ORAL route every 8 hours as needed; 12 tablet; Refills: 0,     cp  

      Product Selection Permitted                                                                 

Signatures:                                                                                       

Dispatcher [employer]                           EDMS                                                 

Justin Vega MD MD kdr Page, Corey, PA                         PA   cp                                                   

Grisel Yung RN                    RN   ap3                                                  

Jinny Mcdonnell RN                   RN   jg9                                                  

                                                                                                  

Corrections: (The following items were deleted from the chart)                                    

11:26 11:21 Urine Pregnancy Test ordered. cp                                                  ab2 

12:48 11:21 Urine Dipstick-Ancillary ordered. cp                                              jg9 

                                                                                                  

**************************************************************************************************

## 2022-03-14 NOTE — RAD REPORT
EXAM DESCRIPTION:  RAD - Knee Left 3 View - 3/14/2022 12:09 pm

 

CLINICAL HISTORY:  PAIN

 

COMPARISON:  No comparisons

 

FINDINGS:  No fracture or dislocation is seen. Trace suprapatellar joint effusion.

## 2022-03-14 NOTE — ER
Nurse's Notes                                                                                     

 [facility]                                                                 

Name: Maria Del Carmen Delacruz                                                                                  

Age: 41 yrs                                                                                       

Sex: Female                                                                                       

: 1980                                                                                   

MRN: [id number]                                                                                   

Arrival Date: 2022                                                                          

Time: 10:50                                                                                       

Account#: [id number]                                                                            

Bed 8                                                                                             

Private MD:                                                                                       

Diagnosis: Pain in left knee;Chest pain, unspecified-right lower rib pain                         

                                                                                                  

Presentation:                                                                                     

                                                                                             

11:14 Chief complaint: Patient states: she was attempting to load a 800lb cow into the        ap3 

      trailer this morning, when the cow decided she did not want to be loaded up, and            

      knocked the patient over, and possibly stepped on her. Patient states she fell back and     

      hit her head on the ground. Patient reports head/neck pain, left knee pain, right rib       

      pain and right arm pain at this time. Coronavirus screen: At this time, the client does     

      not indicate any symptoms associated with coronavirus-19. Ebola Screen: No symptoms or      

      risks identified at this time. Initial Sepsis Screen: Does the patient meet any 2           

      criteria? No. Patient's initial sepsis screen is negative. Does the patient have a          

      suspected source of infection? No. Patient's initial sepsis screen is negative. Risk        

      Assessment: Do you want to hurt yourself or someone else? Patient reports no desire to      

      harm self or others. Onset of symptoms was 2022.                                  

11:14 Method Of Arrival: Ambulatory                                                           ap3 

11:14 Acuity: ISAIAS 3                                                                           ap3 

                                                                                                  

Triage Assessment:                                                                                

11:20 General: Appears uncomfortable, Behavior is calm. Pain: Complains of pain in neck,      ap3 

      head, right rib, right arm, left knee Pain began suddenly. Neuro: Level of                  

      Consciousness is awake, alert, obeys commands, Gait is steady, Speech is normal.            

      Respiratory: Airway is patent Respiratory effort is even, unlabored.                        

                                                                                                  

OB/GYN:                                                                                           

11:21 LMP 3/9/2022                                                                            ap3 

                                                                                                  

Historical:                                                                                       

- Allergies:                                                                                      

11:17 hepatitis Vaccine;                                                                      ap3 

- PMHx:                                                                                           

11:17 Asthma; HYPOGLYCEMIA;                                                                   ap3 

                                                                                                  

- Immunization history:: Client reports having NOT received the Covid vaccine. Last               

  tetanus immunization: up to date Flu vaccine is up to date.                                     

- Social history:: Smoking status: Patient reports the use of cigarette tobacco                   

  products, denies chronic smoking, but will smoke occasionally.                                  

                                                                                                  

                                                                                                  

Screenin:20 Abuse screen: Denies threats or abuse. Nutritional screening: No deficits noted.        ap3 

      Tuberculosis screening: No symptoms or risk factors identified.                             

12:48 Fall Risk None identified.                                                              [email or web address] 

                                                                                                  

Assessment:                                                                                       

11:59 Reassessment: No changes from previously documented assessment.                         jg9 

                                                                                                  

Vital Signs:                                                                                      

11:14  / 85; Pulse 84; Resp 17; Temp 98.6; Pulse Ox 100% ; Weight 77.11 kg; Height 5    ap3 

      ft. 5 in. (165.10 cm); Pain 6/10;                                                           

12:19  / 87; Pulse 80; Resp 16 S; Pulse Ox 100% on R/A;                                 jg9 

11:14 Body Mass Index 28.29 (77.11 kg, 165.10 cm)                                             ap3 

                                                                                                  

ED Course:                                                                                        

10:50 Patient arrived in ED.                                                                  kz  

11:00 Patient has correct armband on for positive identification. Bed in low position. Call   jg9 

      light in reach.                                                                             

11:16 Triage completed.                                                                       ap3 

11:20 Saulo Pierce PA is PHCP.                                                                cp  

11:20 Justin Vega MD is Attending Physician.                                              cp  

11:21 Arm band placed on right wrist. C-collar applied.                                       ap3 

11:23 Jinny Mcdonnell RN is Primary Nurse.                                                 jg9 

11:32 CT Head C Spine In Process Unspecified.                                                 EDMS

12:09 XRAY Knee LEFT 3 view In Process Unspecified.                                           EDMS

12:09 XRAY Ribs RIGHT In Process Unspecified.                                                 EDMS

12:47 No provider procedures requiring assistance completed.                                  jg9 

12:47 Patient did not have IV access during this emergency room visit.                        jg9 

                                                                                                  

Administered Medications:                                                                         

12:26 Drug: Hydrocodone-Acetaminophen (7.5 mg-325 mg) 1 tabs Route: PO;                       jg9 

12:48 Follow up: Response: No adverse reaction; Pain is decreased                             jg9 

                                                                                                  

                                                                                                  

Outcome:                                                                                          

12:43 Discharge ordered by MD.                                                                cp  

12:47 Discharged to home ambulatory.                                                          jg9 

12:47 Condition: improved                                                                         

12:47 Discharge instructions given to patient, Instructed on discharge instructions, follow       

      up and referral plans. Demonstrated understanding of instructions, follow-up care.          

12:49 Prescriptions given X 3.                                                                jg9 

12:50 Patient left the ED.                                                                    jg9 

                                                                                                  

Signatures:                                                                                       

Dispatcher [employer]                           EDMS                                                 

Saulo Pierce PA PA cp Prokisch, Amanda RN                    RN   ap3                                                  

Jinny Mcdonnell RN                   RN   jg9                                                  

Antoinette Vanessaz                                                   

                                                                                                  

Corrections: (The following items were deleted from the chart)                                    

11:17 11:14 Chief complaint: Patient states: she was attempting to load a 800lb cow into the  ap3 

      trailer this morning, when the cow decided she did not want to be loaded up, and            

      knocked the patient over, and possibly stepped on her. Patient states she fell back and     

      hit her head on the ground. Patient reports head/neck pain, left knee pain, and right       

      arm pain at this time. ap3                                                                  

11:21 11:20 General: Appears ap3                                                              ap3 

                                                                                                  

**************************************************************************************************

## 2022-03-14 NOTE — RAD REPORT
EXAM DESCRIPTION:  CT - [id number] - 3/14/2022 11:32 am

 

CLINICAL HISTORY:  Trauma, head and neck injury.

PAIN

 

COMPARISON:  No comparisons

 

TECHNIQUE:  Axial 5 mm thick images of the head were obtained.

 

Axial 2 mm thick images of the cervical spine were obtained with sagittal and coronal reconstruction 
images generated and reviewed.

 

All CT scans are performed using dose optimization technique as appropriate and may include automated
 exposure control or mA/KV adjustment according to patient size.

 

FINDINGS:  CT HEAD WITHOUT CONTRAST:

 

No acute hemorrhage, hydrocephalus or extra-axial collection is identified.No areas of brain edema or
 midline shift.

 

The paranasal sinuses and mastoids are clear.The calvarium is intact.

 

CT CERVICAL SPINE WITHOUT CONTRAST:

 

No fracture or subluxation.Mild lower cervical degenerative changes.No prevertebral soft tissues swel
ling is identified.

 

IMPRESSION:  No acute intracranial or cervical spine findings.